# Patient Record
Sex: MALE | Race: AMERICAN INDIAN OR ALASKA NATIVE | URBAN - NONMETROPOLITAN AREA
[De-identification: names, ages, dates, MRNs, and addresses within clinical notes are randomized per-mention and may not be internally consistent; named-entity substitution may affect disease eponyms.]

---

## 2022-06-23 ENCOUNTER — OFFICE VISIT (OUTPATIENT)
Dept: URBAN - NONMETROPOLITAN AREA CLINIC 1 | Facility: CLINIC | Age: 27
End: 2022-06-23

## 2022-06-23 DIAGNOSIS — H52.223 REGULAR ASTIGMATISM, BILATERAL: Primary | ICD-10-CM

## 2022-06-23 PROCEDURE — 92002 INTRM OPH EXAM NEW PATIENT: CPT | Performed by: OPTOMETRIST

## 2022-06-23 PROCEDURE — 92082 INTERMEDIATE VISUAL FIELD XM: CPT | Performed by: OPTOMETRIST

## 2022-06-23 ASSESSMENT — VISUAL ACUITY
OD: 20/20
OS: 20/20

## 2022-06-23 ASSESSMENT — INTRAOCULAR PRESSURE
OS: 15
OD: 16

## 2022-06-23 ASSESSMENT — KERATOMETRY
OS: 43.13
OD: 43.38

## 2022-06-23 NOTE — IMPRESSION/PLAN
Impression: Regular astigmatism, bilateral: H52.223. Plan: Educated pt on exam findings. Updated and finalized SRx. Educated pt on 1-2 week adaptation period with updated SRx. Pt expressed understanding. RTC 1 year for CE, or PRN. Filled paperwork out for MMA fighting. Pt declined flashes of light, new floaters in vision and curtain/veil. Pt denies history of eye injuries apart from a corneal abrasion. Warning signs of retinal tear (RT) and retinal detachment (RD) discussed with patient. Pt. instructed to contact our office ASAP if loss of vision, any worsening of symptoms, or changes consistent with RT or RD. Pt did not want to update CLs today.

## 2025-04-17 ENCOUNTER — OFFICE VISIT (OUTPATIENT)
Dept: URBAN - NONMETROPOLITAN AREA CLINIC 1 | Facility: CLINIC | Age: 30
End: 2025-04-17

## 2025-04-17 DIAGNOSIS — H52.223 REGULAR ASTIGMATISM, BILATERAL: Primary | ICD-10-CM

## 2025-04-17 DIAGNOSIS — Z13.5 ENCOUNTER FOR SCREENING FOR EYE AND EAR DISORDERS: ICD-10-CM

## 2025-04-17 PROCEDURE — 92012 INTRM OPH EXAM EST PATIENT: CPT | Performed by: OPTOMETRIST

## 2025-04-17 ASSESSMENT — INTRAOCULAR PRESSURE
OD: 18
OS: 17

## 2025-04-17 ASSESSMENT — KERATOMETRY
OD: 43.13
OS: 43.13